# Patient Record
Sex: FEMALE | Race: WHITE
[De-identification: names, ages, dates, MRNs, and addresses within clinical notes are randomized per-mention and may not be internally consistent; named-entity substitution may affect disease eponyms.]

---

## 2020-01-29 ENCOUNTER — HOSPITAL ENCOUNTER (EMERGENCY)
Dept: HOSPITAL 7 - FB.ED | Age: 50
Discharge: HOME | End: 2020-01-29
Payer: MEDICAID

## 2020-01-29 DIAGNOSIS — F17.200: ICD-10-CM

## 2020-01-29 DIAGNOSIS — Z88.8: ICD-10-CM

## 2020-01-29 DIAGNOSIS — Z79.52: ICD-10-CM

## 2020-01-29 DIAGNOSIS — I10: ICD-10-CM

## 2020-01-29 DIAGNOSIS — Z88.6: ICD-10-CM

## 2020-01-29 DIAGNOSIS — Z88.2: ICD-10-CM

## 2020-01-29 DIAGNOSIS — Z88.5: ICD-10-CM

## 2020-01-29 DIAGNOSIS — J45.909: ICD-10-CM

## 2020-01-29 DIAGNOSIS — L50.0: Primary | ICD-10-CM

## 2020-01-29 DIAGNOSIS — R06.2: ICD-10-CM

## 2020-01-29 DIAGNOSIS — Z91.040: ICD-10-CM

## 2020-01-29 PROCEDURE — 96361 HYDRATE IV INFUSION ADD-ON: CPT

## 2020-01-29 PROCEDURE — 96375 TX/PRO/DX INJ NEW DRUG ADDON: CPT

## 2020-01-29 PROCEDURE — 94640 AIRWAY INHALATION TREATMENT: CPT

## 2020-01-29 PROCEDURE — 99284 EMERGENCY DEPT VISIT MOD MDM: CPT

## 2020-01-29 PROCEDURE — 96374 THER/PROPH/DIAG INJ IV PUSH: CPT

## 2020-01-29 NOTE — EDM.PDOC
ED HPI GENERAL MEDICAL PROBLEM





- General


Stated Complaint: SOB


Time Seen by Provider: 01/29/20 06:25


Source of Information: Reports: Patient


History Limitations: Reports: No Limitations





- History of Present Illness


INITIAL COMMENTS - FREE TEXT/NARRATIVE: 





49-year-old female who reports beginning on Sunday she noted that she needed to 

use her inhaler more frequently and she had a rash on her face and neck and 

upper chest that was quite itchy and red and raised. The rash has come and gone 

has been fairly persistently coming associated with itching for the past 3 days 

and today she has noticed some feelings of increased wheezing and trouble 

breathing. She thinks it might be a new laundry soap that she has changed to as 

there are really no other exposures she feels. She has no pain. She rates her 

pain as a 0/10. No nausea or vomiting. No sore throat. No nasal congestion. She 

has been eating and drinking normally. She is having no trouble swallowing. No 

weakness or dizziness. There are no other associated signs or symptoms. There 

are no other modifying factors. She does report that she was started on a new 

medication for blood pressure about 2 weeks ago. She has had no symptoms until 

Sunday of this week. There are no other associated signs or symptoms. There are 

no other modifying factors.


Onset: Other (3 days)


Duration: Getting Worse


Location: Reports: Generalized


Quality: Reports: Other (Itching)


Severity: Severe


Improves with: Reports: None


Worsens with: Reports: None


Context: Reports: Other (As above)


Associated Symptoms: Reports: Nausea/Vomiting (Mild nausea), Shortness of Breath


Treatments PTA: Reports: Other (see below) (Albuterol inhaler)





- Related Data


 Allergies











Allergy/AdvReac Type Severity Reaction Status Date / Time


 


aspirin Allergy  Other Verified 01/29/20 06:41


 


ibuprofen Allergy  Other Verified 01/29/20 06:41


 


latex Allergy  Other Verified 01/29/20 06:41


 


morphine Allergy  Other Verified 01/29/20 06:41


 


ranitidine [From Zantac] Allergy  Other Verified 01/29/20 06:41


 


Sulfa (Sulfonamide Allergy  Other Verified 01/29/20 06:41





Antibiotics)     











Home Meds: 


 Home Meds





diphenhydrAMINE [Benadryl] 50 mg PO QID #20 cap 01/29/20 [Rx]


predniSONE [Prednisone] 60 mg PO DAILY 4 Days #12 tablet 01/29/20 [Rx]











Past Medical History


Cardiovascular History: Reports: Hypertension


Respiratory History: Reports: Asthma





Social & Family History





- Tobacco Use


Smoking Status *Q: Current Every Day Smoker





- Alcohol Use


Alcohol Use History: No





- Living Situation & Occupation


Occupation: Employed





ED ROS ALLERGIC REACTION





- Review of Systems


Review Of Systems: See Below


Constitutional: Reports: No Symptoms


HEENT: Reports: Other (Physical position)


Respiratory: Reports: Shortness of Breath, Wheezing


Cardiovascular: Reports: No Symptoms


Endocrine: Reports: No Symptoms


GI/Abdominal: Reports: No Symptoms


: Reports: No Symptoms


Musculoskeletal: Reports: No Symptoms


Skin: Reports: Rash, Urticaria


Neurological: Reports: No Symptoms


Psychiatric: Reports: No Symptoms


Hematologic/Lymphatic: Reports: No Symptoms


Immunologic: Reports: No Symptoms





ED EXAM GENERAL NO PERIP PULSE





- Physical Exam


Exam: See Below


Exam Limited By: No Limitations


General Appearance: Alert, WD/WN


Eye Exam: Bilateral Eye: EOMI, Normal Inspection, PERRL


Ears: Normal External Exam, Hearing Grossly Normal


Nose: Normal Inspection, Normal Mucosa, No Blood


Throat/Mouth: Normal Inspection, Normal Lips, Normal Oropharynx, Normal Voice, 

No Airway Compromise


Head: Atraumatic, Normocephalic


Neck: Normal Inspection, Supple, Non-Tender, Full Range of Motion, Other (

Stridor)


Respiratory/Chest: No Respiratory Distress, No Accessory Muscle Use, Chest Non-

Tender, Wheezing, Other (Air movement somewhat decreased)


Cardiovascular: Normal Peripheral Pulses, Regular Rate, Rhythm, No JVD


GI/Abdominal: Normal Bowel Sounds, Soft, Non-Tender, No Organomegaly


Back Exam: Normal Inspection


Extremities: Normal Inspection, Normal Range of Motion, Non-Tender, No Pedal 

Edema, Normal Capillary Refill


Neurological: Alert, Oriented, CN II-XII Intact, Normal Cognition, No Motor/

Sensory Deficits


Psychiatric: Normal Affect


Skin Exam: Warm, Dry, Intact, Normal Color





Course





- Vital Signs


Last Recorded V/S: 


 Last Vital Signs











Temp  36.6 C   01/29/20 06:59


 


Pulse  98   01/29/20 06:59


 


Resp  14   01/29/20 06:59


 


BP  148/81 H  01/29/20 06:59


 


Pulse Ox  98   01/29/20 06:59














- Orders/Labs/Meds


Orders: 


 Active Orders 24 hr











 Category Date Time Status


 


 RT Aerosol Therapy [RC] ASDIRECTED Care  01/29/20 06:43 Active


 


 Sodium Chloride 0.9% [Normal Saline] 1,000 ml Med  01/29/20 06:45 Active





 IV ASDIRECTED   


 


 Sodium Chloride 0.9% [Saline Flush] Med  01/29/20 06:42 Active





 10 ml FLUSH ASDIRECTED PRN   


 


 Peripheral IV Insertion Adult [OM.PC] Routine Oth  01/29/20 06:42 Ordered








 Medication Orders





Sodium Chloride (Normal Saline)  1,000 mls @ 100 mls/hr IV ASDIRECTED ANGIE


   Last Admin: 01/29/20 06:57  Dose: 100 mls/hr


Sodium Chloride (Saline Flush)  10 ml FLUSH ASDIRECTED PRN


   PRN Reason: Keep Vein Open


   Last Admin: 01/29/20 07:02  Dose: 10 ml








Meds: 


Medications











Generic Name Dose Route Start Last Admin





  Trade Name Freq  PRN Reason Stop Dose Admin


 


Sodium Chloride  1,000 mls @ 100 mls/hr  01/29/20 06:45  01/29/20 06:57





  Normal Saline  IV   100 mls/hr





  ASDIRECTED ANGIE   Administration





     





     





     





     


 


Sodium Chloride  10 ml  01/29/20 06:42  01/29/20 07:02





  Saline Flush  FLUSH   10 ml





  ASDIRECTED PRN   Administration





  Keep Vein Open   





     





     





     














Discontinued Medications














Generic Name Dose Route Start Last Admin





  Trade Name Freq  PRN Reason Stop Dose Admin


 


Albuterol  2.5 mg  01/29/20 06:42  01/29/20 06:57





  Proventil Neb Soln  NEB  01/29/20 06:43  2.5 mg





  ONETIME ONE   Administration





     





     





     





     


 


Albuterol/Ipratropium  3 ml  01/29/20 06:42  01/29/20 06:57





  Duoneb 3.0-0.5 Mg/3 Ml  NEB  01/29/20 06:43  3 ml





  ONETIME ONE   Administration





     





     





     





     


 


Diphenhydramine HCl  50 mg  01/29/20 06:42  01/29/20 06:57





  Benadryl  IVPUSH  01/29/20 06:43  50 mg





  ONETIME ONE   Administration





     





     





     





     


 


Methylprednisolone Sodium Succinate  125 mg  01/29/20 06:42  01/29/20 06:57





  Solu-Medrol  IVPUSH  01/29/20 06:43  125 mg





  ONETIME ONE   Administration





     





     





     





     














- Re-Assessments/Exams


Free Text/Narrative Re-Assessment/Exam: 





01/29/20 08:25: Patient has received Solu-Medrol 125 mg IV, Benadryl 50 mg IV 

and DuoNeb/albuterol neb combination and has been observed. Her rash is pretty 

much gone. The wheezing is resolved. She feels much improved and is ready for 

discharge at this point.








Departure





- Departure


Time of Disposition: 08:45


Disposition: Home, Self-Care 01


Condition: Good (Improved)


Clinical Impression: 


 Urticaria, Wheezing





Allergic reaction


Qualifiers:


 Encounter type: initial encounter Qualified Code(s): T78.40XA - Allergy, 

unspecified, initial encounter








- Discharge Information


Prescriptions: 


diphenhydrAMINE [Benadryl] 50 mg PO QID #20 cap


predniSONE [Prednisone] 60 mg PO DAILY 4 Days #12 tablet


Instructions:  Hives, Easy-to-Read, Allergies, Adult


Referrals: 


PCP,Unknown [Primary Care Provider] - 


Additional Instructions: 


You are having an allergic reaction with hives and some wheezing. As you our 

thinking, this may be related to the new laundry soap that you started using. 

You should avoid using this in the future. Use your albuterol inhaler as 

needed. Medication as prescribed (Benadryl 50 mg, prednisone). Drink plenty of 

fluids. Rest. Back to the emergency department for worsening rash, worse 

breathing or any other concerning sign or symptom.





Sepsis Event Note





- Focused Exam


Vital Signs: 


 Vital Signs











  Temp Pulse Resp BP Pulse Ox


 


 01/29/20 06:59  36.6 C  98  14  148/81 H  98











Date Exam was Performed: 01/29/20


Time Exam was Performed: 08:50





- My Orders


Last 24 Hours: 


My Active Orders





01/29/20 06:42


Sodium Chloride 0.9% [Saline Flush]   10 ml FLUSH ASDIRECTED PRN 


Peripheral IV Insertion Adult [OM.PC] Routine 





01/29/20 06:43


RT Aerosol Therapy [RC] ASDIRECTED 





01/29/20 06:45


Sodium Chloride 0.9% [Normal Saline] 1,000 ml IV ASDIRECTED 














- Assessment/Plan


Last 24 Hours: 


My Active Orders





01/29/20 06:42


Sodium Chloride 0.9% [Saline Flush]   10 ml FLUSH ASDIRECTED PRN 


Peripheral IV Insertion Adult [OM.PC] Routine 





01/29/20 06:43


RT Aerosol Therapy [RC] ASDIRECTED 





01/29/20 06:45


Sodium Chloride 0.9% [Normal Saline] 1,000 ml IV ASDIRECTED

## 2020-10-12 NOTE — CR
CHEST TWO VIEWS



INDICATION:  Cough, shortness of breath, wheezing.



Two AP upright views of the chest were obtained 10/12/2020 and compared with 
06/08/2009.



Evidence of exogenous obesity is noted and is markedly increased compared with 
the previous study. 



The heart did not appear grossly enlarged but was prominent.  



Overlying EKG leads are noted.  



A definite active infiltrate or effusion was not identified.  



IMPRESSION:  No definite acute process.  



When clinically possible, full inspiration PA and lateral views of the chest may
be helpful.  

MTDD

## 2020-10-12 NOTE — EDM.PDOC
ED HPI GENERAL MEDICAL PROBLEM





- General


Chief Complaint: Asthma


Stated Complaint: LUNGS


Time Seen by Provider: 10/12/20 17:10


Source of Information: Reports: Patient


History Limitations: Reports: No Limitations





- History of Present Illness


INITIAL COMMENTS - FREE TEXT/NARRATIVE: 





50-year-old female who reports that for the last 2 weeks she has been having 

cough with wheezing. She reports that she also has been having intermittent 

pains in her upper chest that are sharp and seemed to worse when she breathes 

they seem to come and go lasting for minutes at a time and then there soreness 

after this. These symptoms have been increasingly worsening over the past 2 

weeks and becoming more frequent. She also notices that she has been using her 

albuterol nebulizer frequently and it helps for a little bit but then she has 

wheezing again. She feels that she is becoming more short of breath with time 

and that her wheezing is not going away with her medications. She was recently 

stung by a bee and had some allergic reaction to this and was on prednisone. She

is currently off prednisone for the past 2 days and she feels that her symptoms 

have worsened since then. He really has 0/10 pain now but the pain can be as 

high as a 10/10 and she was told to come to the emergency department by [facility] 

provider for further evaluation for this. She has had no nausea or vomiting. She

has been able to eat and drink normally. She has noted no fever. She has then 

able to swallow normally. There are no other associated signs or symptoms. There

are no other modifying factors.


Onset: Other (Ongoing for the past 2 weeks)


Duration: Getting Worse


Location: Reports: Chest


Quality: Reports: Sharp


Severity: Mild (To severe at times)


Improves with: Reports: Other (Seems to go away/improve on its own.)


Worsens with: Reports: Breathing, Other (Palpation)


Context: Reports: Other (As above.)


Associated Symptoms: Reports: No Other Symptoms (None except as above.)


Treatments PTA: Reports: Other (see below) (Nothing.)





- Related Data


                                    Allergies











Allergy/AdvReac Type Severity Reaction Status Date / Time


 


aspirin Allergy  Other Verified 10/12/20 16:09


 


ibuprofen Allergy  Other Verified 10/12/20 16:09


 


latex Allergy  Other Verified 10/12/20 16:09


 


morphine Allergy  Other Verified 10/12/20 16:09


 


ranitidine [From Zantac] Allergy  Other Verified 10/12/20 16:09


 


Sulfa (Sulfonamide Allergy  Other Verified 10/12/20 16:09





Antibiotics)     











Home Meds: 


                                    Home Meds





Albuterol Sulfate 1 applic IH QID PRN 20 [History]


Losartan/Hydrochlorothiazide [Losartan-HCTZ 50-12.5 MG] 1 each PO DAILY 20

[History]


Albuterol [Proventil Neb Soln] 2.5 mg NEB Q6HR #1 box 10/03/20 [Rx]


Azithromycin [Zithromax] 250 mg PO DAILY #4 tab 10/12/20 [Rx]


predniSONE [Prednisone] 1 dose PO QAM 12 Days #21 tablet 10/12/20 [Rx]


traMADol [Ultram] 50 mg PO Q6H PRN #12 tab 10/12/20 [Rx]











Past Medical History


Cardiovascular History: Reports: Hypertension


Respiratory History: Reports: Asthma


Musculoskeletal History: Reports: Other (See Below)


Other Musculoskeletal History: osteomylitis





- Infectious Disease History


Infectious Disease History: Reports: Chicken Pox





- Past Surgical History


HEENT Surgical History: Reports: Adenoidectomy, Myringotomy w Tube(s), 

Tonsillectomy


GI Surgical History: Reports: Appendectomy, Cholecystectomy


Female  Surgical History: Reports:  Section (History), Other (See 

Below) (Several laparoscopies for ovarian cystectomies.)





Social & Family History





- Family History


Family Medical History: Noncontributory





- Tobacco Use


Smoking Status *Q: Current Every Day Smoker


Years of Tobacco use: 35


Packs/Tins Daily: 1


Second Hand Smoke Exposure: Yes





- Caffeine Use


Caffeine Use: Reports: Coffee





- Recreational Drug Use


Recreational Drug Use: No





- Living Situation & Occupation


Occupation: Employed





ED ROS GENERAL





- Review of Systems


Review Of Systems: See Below


Constitutional: Reports: No Symptoms


HEENT: Reports: Other (Nasal congestion.)


Respiratory: Reports: Shortness of Breath, Wheezing, Pleuritic Chest Pain, Cough


Cardiovascular: Reports: Chest Pain


GI/Abdominal: Reports: No Symptoms


: Reports: No Symptoms


Musculoskeletal: Reports: No Symptoms


Skin: Reports: No Symptoms


Neurological: Reports: No Symptoms


Hematologic/Lymphatic: Reports: No Symptoms


Immunologic: Reports: No Symptoms





ED EXAM, GENERAL





- Physical Exam


Exam: See Below


Exam Limited By: No Limitations


General Appearance: Alert, Mild Distress, Obese


Eye Exam: Bilateral Eye: EOMI, Normal Inspection


Ears: Normal External Exam, Hearing Grossly Normal


Ear Exam: Bilateral Ear: Auricle Normal


Nose: No Blood, Nasal Drainage


Throat/Mouth: Normal Oropharynx, Normal Voice, No Airway Compromise


Head: Atraumatic, Normocephalic


Neck: Normal Inspection, Supple, Non-Tender, Full Range of Motion


Respiratory/Chest: No Accessory Muscle Use, Wheezing (Bilaterally), Prolonged 

Expiration.  No: Rales


Cardiovascular: Normal Peripheral Pulses, Regular Rate, Rhythm, No JVD


Peripheral Pulses: 2+: Radial (L), Radial (R), Dorsalis Pedis (L), Dorsalis 

Pedis (R)


GI/Abdominal: Normal Bowel Sounds, Soft, Non-Tender, No Mass


Back Exam: Normal Inspection


Extremities: Normal Inspection, Normal Range of Motion, Non-Tender, No Pedal 

Edema, Normal Capillary Refill


Neurological: Alert, Oriented, CN II-XII Intact, Normal Cognition, No 

Motor/Sensory Deficits


Psychiatric: Normal Affect


Skin Exam: Warm, Dry, Intact, Normal Color, No Rash





EKG INTERPRETATION


EKG Date: 10/12/20


Time: 16:31


Rhythm: NSR


Rate (Beats/Min): 83


Axis: Normal (83)


P-Wave: Present


QRS: Normal


ST-T: Other (Nonspecific T-wave abnormalities.)


QT: Normal


Comparison: NA - No Prior EKG (Essentially normal EKG.)





Course





- Vital Signs


Last Recorded V/S: 


                                Last Vital Signs











Temp  36.7 C   10/12/20 17:11


 


Pulse  88   10/12/20 17:11


 


Resp  18   10/12/20 17:11


 


BP  148/74 H  10/12/20 17:11


 


Pulse Ox  96   10/12/20 17:11














- Orders/Labs/Meds


Orders: 


                               Active Orders 24 hr











 Category Date Time Status


 


 Chest 1V Frontal [CR] Stat Exams  10/12/20 18:10 Taken


 


 Peripheral IV Insertion Adult [OM.PC] Routine Oth  10/12/20 17:22 Ordered


 


 EKG 12 Lead [EK] Routine Ther  10/12/20 17:22 Ordered











Labs: 


                                Laboratory Tests











  10/12/20 10/12/20 10/12/20 Range/Units





  17:40 17:40 17:40 


 


WBC  13.1 H    (4.5-12.0)  X10-3/uL


 


RBC  5.00    (3.23-5.20)  x10(6)uL


 


Hgb  15.0    (11.5-15.5)  g/dL


 


Hct  43.8    (30.0-51.3)  %


 


MCV  87.5    (80-96)  fL


 


MCH  29.9    (27.7-33.6)  pg


 


MCHC  34.2    (32.2-35.4)  g/dL


 


RDW  13.0    (11.5-15.5)  %


 


Plt Count  294    (125-369)  X10(3)uL


 


MPV  9.2    (7.4-10.4)  fL


 


Neut % (Auto)  64.1    (46-82)  %


 


Lymph % (Auto)  26.3    (13-37)  %


 


Mono % (Auto)  4.9    (4-12)  %


 


Eos % (Auto)  4    (1.0-5.0)  %


 


Baso % (Auto)  1    (0-2)  %


 


Neut # (Auto)  8.4 H    (1.6-8.3)  #


 


Lymph # (Auto)  3.5    (0.6-5.0)  #


 


Mono # (Auto)  0.6    (0.0-1.3)  #


 


Eos # (Auto)  0.5    (0.0-0.8)  #


 


Baso # (Auto)  0.1    (0.0-0.2)  #


 


D-Dimer, Quantitative     (0.0-0.59)  mg/LFEU


 


Sodium   138   (135-145)  mmol/L


 


Potassium   4.4   (3.5-5.3)  mmol/L


 


Chloride   102   (100-110)  mmol/L


 


Carbon Dioxide   25   (21-32)  mmol/L


 


BUN   15   (7-18)  mg/dL


 


Creatinine   0.8   (0.55-1.02)  mg/dL


 


Est Cr Clr Drug Dosing   TNP   


 


Estimated GFR (MDRD)   > 60   (>60)  


 


BUN/Creatinine Ratio   18.8   (9-20)  


 


Glucose   104   ()  mg/dL


 


Calcium   9.2   (8.6-10.2)  mg/dL


 


Magnesium   1.9   (1.8-2.5)  mg/dL


 


Total Bilirubin   0.5   (0.1-1.3)  mg/dL


 


AST   14   (5-25)  IU/L


 


ALT   38 H   (12-36)  U/L


 


Alkaline Phosphatase   85   ()  IU/L


 


Troponin I    8.9  (4.0-60.3)  pg/mL


 


NT-Pro-B Natriuret Pep     (<=125)  pg/mL


 


Total Protein   7.6   (6.0-8.0)  g/dL


 


Albumin   3.8   (3.5-5.2)  g/dL


 


Globulin   3.8   g/dL


 


Albumin/Globulin Ratio   1.0   














  10/12/20 10/12/20 Range/Units





  17:40 17:40 


 


WBC    (4.5-12.0)  X10-3/uL


 


RBC    (3.23-5.20)  x10(6)uL


 


Hgb    (11.5-15.5)  g/dL


 


Hct    (30.0-51.3)  %


 


MCV    (80-96)  fL


 


MCH    (27.7-33.6)  pg


 


MCHC    (32.2-35.4)  g/dL


 


RDW    (11.5-15.5)  %


 


Plt Count    (125-369)  X10(3)uL


 


MPV    (7.4-10.4)  fL


 


Neut % (Auto)    (46-82)  %


 


Lymph % (Auto)    (13-37)  %


 


Mono % (Auto)    (4-12)  %


 


Eos % (Auto)    (1.0-5.0)  %


 


Baso % (Auto)    (0-2)  %


 


Neut # (Auto)    (1.6-8.3)  #


 


Lymph # (Auto)    (0.6-5.0)  #


 


Mono # (Auto)    (0.0-1.3)  #


 


Eos # (Auto)    (0.0-0.8)  #


 


Baso # (Auto)    (0.0-0.2)  #


 


D-Dimer, Quantitative  0.47   (0.0-0.59)  mg/LFEU


 


Sodium    (135-145)  mmol/L


 


Potassium    (3.5-5.3)  mmol/L


 


Chloride    (100-110)  mmol/L


 


Carbon Dioxide    (21-32)  mmol/L


 


BUN    (7-18)  mg/dL


 


Creatinine    (0.55-1.02)  mg/dL


 


Est Cr Clr Drug Dosing    


 


Estimated GFR (MDRD)    (>60)  


 


BUN/Creatinine Ratio    (9-20)  


 


Glucose    ()  mg/dL


 


Calcium    (8.6-10.2)  mg/dL


 


Magnesium    (1.8-2.5)  mg/dL


 


Total Bilirubin    (0.1-1.3)  mg/dL


 


AST    (5-25)  IU/L


 


ALT    (12-36)  U/L


 


Alkaline Phosphatase    ()  IU/L


 


Troponin I    (4.0-60.3)  pg/mL


 


NT-Pro-B Natriuret Pep   131 H  (<=125)  pg/mL


 


Total Protein    (6.0-8.0)  g/dL


 


Albumin    (3.5-5.2)  g/dL


 


Globulin    g/dL


 


Albumin/Globulin Ratio    











Meds: 


Medications














Discontinued Medications














Generic Name Dose Route Start Last Admin





  Trade Name Freq  PRN Reason Stop Dose Admin


 


Albuterol  5 mg  10/12/20 17:24  10/12/20 17:49





  Proventil Neb Soln  NEB  10/12/20 17:25  5 mg





  ONETIME ONE   Administration


 


Albuterol/Ipratropium  3 ml  10/12/20 17:24  10/12/20 17:49





  Duoneb 3.0-0.5 Mg/3 Ml  NEB  10/12/20 17:25  3 ml





  ONETIME ONE   Administration


 


Azithromycin  500 mg  10/12/20 18:51  10/12/20 18:56





  Zithromax  PO  10/12/20 18:52  500 mg





  ONETIME ONE   Administration


 


Methylprednisolone Sodium Succinate  125 mg  10/12/20 17:25  10/12/20 17:49





  Solu-Medrol  IVPUSH  10/12/20 17:26  125 mg





  ONETIME ONE   Administration


 


Sodium Chloride  10 ml  10/12/20 17:22 





  Saline Flush  FLUSH  





  ASDIRECTED PRN  





  Keep Vein Open  














- Radiology Interpretation


Free Text/Narrative:: 





One view chest x-ray shows no definite infiltrate.





- Re-Assessments/Exams


Free Text/Narrative Re-Assessment/Exam: 





10/12/20 18:45: Patient reports that she feels much improved. Her breathing is 

much better. She still has some soreness in her chest and it is reproducible 

with palpation and with deep breath at this point. Her d-dimer and troponin was 

normal. With the amount time she has been having the chest pain, I feel that 

this rules out MI as her EKG is reassuring as well. The pain in her chest 

appears to be musculoskeletal. Her exam shows much improved air movement with 

almost no wheezing now. I will plan on treating the patient with Zithromax for 5

 day course. I will also place her on prednisone over a longer taper again (12 

days) and I will give her prescription for tramadol for pain. She was given a 

take-home pack of the tramadol and a prescription to take home as well. A 

prescription for the prednisone was sent to [facility]. She is 

to follow-up with her primary provider as needed. Percussions and reasons for 

return to the emergency department discussed with the patient while she was in 

the emergency department ever detailed in the patient's discharge instructions.








Departure





- Departure


Time of Disposition: 19:00


Disposition: Home, Self-Care 01


Condition: Good (Improved)


Clinical Impression: 


 Acute exacerbation of extrinsic asthma, Musculoskeletal chest pain





Asthmatic bronchitis


Qualifiers:


 Asthma severity: moderate Asthma persistence: persistent Asthma complication 

type: with acute exacerbation Qualified Code(s): J45.41 - Moderate persistent 

asthma with (acute) exacerbation








- Discharge Information


Prescriptions: 


predniSONE [Prednisone] 1 dose PO QAM 12 Days #21 tablet


traMADol [Ultram] 50 mg PO Q6H PRN #12 tab


 PRN Reason: Moderate to severe pain


Azithromycin [Zithromax] 250 mg PO DAILY #4 tab


Instructions:  How to Use a Nebulizer, Adult, Chest Wall Pain, Easy-to-Read, 

Nonspecific Chest Pain, Adult, Easy-to-Read, Asthma, Adult, Easy-to-Read


Referrals: 


PCP,None [Primary Care Provider] - 


Forms:  ED Department Discharge, ED Return to Work/School Form


Additional Instructions: 


Your blood tests were all reassuringly normal. Your EKG was reassuring and 

showed no evidence of a heart attack. Your heart enzyme test was normal and this

rules out a heart attack. You do not appear to be having any problem with your 

heart at this time. Also a screening blood tests for blood clots was negative 

and this rules out blood clots as well. This chest pain appears to be from the 

muscles in your chest wall. Continue to do the albuterol nebulizer treatments 

every 4 hours as needed at home. Increase your fluid intake. You can take 

Tylenol 1000 mg by mouth every 6 hours as needed for pain. Medication as 

prescribed for more severe pain (tramadol 50 mg). Other medication as prescribed

(Zithromax, prednisone). Follow-up with your primary doctor as needed. Back to 

the emergency department for worse breathing, unrelenting vomiting, worsening 

chest pain or any other concerning sign or symptom.





Sepsis Event Note (ED)





- Focused Exam


Vital Signs: 


                                   Vital Signs











  Temp Pulse Resp BP Pulse Ox


 


 10/12/20 17:11  36.7 C  88  18  148/74 H  96














- My Orders


Last 24 Hours: 


My Active Orders





10/12/20 17:22


Peripheral IV Insertion Adult [OM.PC] Routine 


EKG 12 Lead [EK] Routine 





10/12/20 18:10


Chest 1V Frontal [CR] Stat 














- Assessment/Plan


Last 24 Hours: 


My Active Orders





10/12/20 17:22


Peripheral IV Insertion Adult [OM.PC] Routine 


EKG 12 Lead [EK] Routine 





10/12/20 18:10


Chest 1V Frontal [CR] Stat

## 2023-08-19 NOTE — EDM.PDOC
ED HPI GENERAL MEDICAL PROBLEM





- General


Chief Complaint: Allergic Reaction


Stated Complaint: BEE STING


Time Seen by Provider: 10/03/20 16:35


Source of Information: Reports: Patient


History Limitations: Reports: No Limitations





- History of Present Illness


INITIAL COMMENTS - FREE TEXT/NARRATIVE: 





states she was just stung by a bee : not 30mins before getting to ER


has had bee sting before and she is allergic to bees 


had stung her on the left 2nd toe 


Also developed wheezing immediately , 


did take benadryl before coming to the ER


Onset: Today


Onset Date: 10/03/20


Onset Time: 16:00


Duration: Getting Worse


Location: Reports: Face, Neck, Chest, Lower Extremity, Left


Quality: Reports: Burning


Severity: Moderate


Improves with: Reports: Cold Therapy


Context: Reports: Other (stung by bee on her left otoe)


Associated Symptoms: Reports: Cough, Rash, Weakness


Treatments PTA: Reports: Other (see below) (benadryl)


  ** 2nd toe at the left foot


Pain Score (Numeric/FACES): 9





- Related Data


                                    Allergies











Allergy/AdvReac Type Severity Reaction Status Date / Time


 


aspirin Allergy  Other Verified 10/03/20 18:23


 


ibuprofen Allergy  Other Verified 10/03/20 18:23


 


latex Allergy  Other Verified 10/03/20 18:23


 


morphine Allergy  Other Verified 10/03/20 18:23


 


ranitidine [From Zantac] Allergy  Other Verified 10/03/20 18:23


 


Sulfa (Sulfonamide Allergy  Other Verified 10/03/20 18:23





Antibiotics)     











Home Meds: 


                                    Home Meds





Albuterol Sulfate 1 applic IH QID PRN 01/29/20 [History]


Budesonide/Formoterol [Symbicort 80-4.5 MCG] 1 puff INH BID 01/29/20 [History]


Losartan/Hydrochlorothiazide [Losartan-HCTZ 50-12.5 MG] 1 each PO DAILY 01/29/20

[History]


diphenhydrAMINE [Benadryl] 50 mg PO QID #20 cap 01/29/20 [Rx]


predniSONE [Prednisone] 60 mg PO DAILY 4 Days #12 tablet 01/29/20 [Rx]


Albuterol [Proventil Neb Soln] 2.5 mg NEB Q6HR #1 box 10/03/20 [Rx]


Triamcinolone Acetonide [Triamcinolone Acetonide 0.5% Oint] 15 gm TOP BID #2 

tube 10/03/20 [Rx]


predniSONE [Prednisone] 50 mg PO DAILY #5 tablet 10/03/20 [Rx]











Past Medical History


Cardiovascular History: Reports: Hypertension


Respiratory History: Reports: Asthma


Musculoskeletal History: Reports: Other (See Below)


Other Musculoskeletal History: osteomylitis





- Past Surgical History


HEENT Surgical History: Reports: Adenoidectomy, Myringotomy w Tube(s), 

Tonsillectomy


GI Surgical History: Reports: Appendectomy, Cholecystectomy





Social & Family History





- Living Situation & Occupation


Occupation: Employed





ED ROS ALLERGIC REACTION





- Review of Systems


Review Of Systems: See Below


Constitutional: Reports: No Symptoms


HEENT: Reports: No Symptoms, Rhinitis.  Denies: Throat Pain, Throat Swelling


Respiratory: Reports: Shortness of Breath, Wheezing, Cough, Sputum.  Denies: 

Pleuritic Chest Pain


Cardiovascular: Reports: No Symptoms


Endocrine: Reports: No Symptoms


GI/Abdominal: Reports: No Symptoms


: Reports: No Symptoms


Musculoskeletal: Reports: No Symptoms


Skin: Reports: Rash


Neurological: Reports: No Symptoms





ED EXAM GENERAL NO PERIP PULSE





- Physical Exam


Exam: See Below


Exam Limited By: No Limitations


General Appearance: Alert, WD/WN, No Apparent Distress, Obese


Eye Exam: Bilateral Eye: EOMI


Ears: Normal External Exam


Nose: Normal Inspection


Throat/Mouth: Normal Inspection


Head: Normocephalic


Neck: Supple, Non-Tender


Respiratory/Chest: Decreased Breath Sounds, Rhonchi, Wheezing


Cardiovascular: Regular Rate, Rhythm


GI/Abdominal: Soft, Non-Tender


Extremities: Increased Warmth, Redness (on the tip of the left second toe)


Neurological: Alert, Oriented, CN II-XII Intact


Psychiatric: Normal Affect





Course





- Vital Signs


Last Recorded V/S: 


                                Last Vital Signs











Temp  36.7 C   10/03/20 17:40


 


Pulse  91   10/03/20 17:40


 


Resp  20   10/03/20 17:40


 


BP  152/74 H  10/03/20 17:40


 


Pulse Ox  97   10/03/20 17:40














- Orders/Labs/Meds


Meds: 


Medications














Discontinued Medications














Generic Name Dose Route Start Last Admin





  Trade Name Freq  PRN Reason Stop Dose Admin


 


Albuterol/Ipratropium  3 ml  10/03/20 16:34  10/03/20 16:44





  Duoneb 3.0-0.5 Mg/3 Ml  NEB  10/03/20 16:35  3 ml





  ONETIME ONE   Administration


 


Albuterol/Ipratropium  3 ml  10/03/20 17:05  10/03/20 17:11





  Duoneb 3.0-0.5 Mg/3 Ml  NEB  10/03/20 17:06  3 ml





  ONETIME ONE   Administration


 


Budesonide  0.5 mg  10/03/20 16:34  10/03/20 16:43





  Pulmicort  NEB  10/03/20 16:35  0.5 mg





  ONETIME ONE   Administration


 


Hydrocortisone  1 gm  10/03/20 17:12  10/03/20 17:31





  Hydrocortisone 1% Oint  TOP  10/03/20 17:13  1 applic





  NOW STA   Administration


 


Methylprednisolone Sodium Succinate  125 mg  10/03/20 16:34  10/03/20 16:46





  Solu-Medrol  IM  10/03/20 16:35  Not Given





  ONETIME ONE  


 


Methylprednisolone Sodium Succinate  Confirm  10/03/20 16:34  10/03/20 16:41





  Solu-Medrol  Administered  10/03/20 16:35  Not Given





  Dose  





  125 mg  





  .ROUTE  





  .STK-MED ONE  


 


Methylprednisolone Sodium Succinate  125 mg  10/03/20 16:46  10/03/20 16:49





  Solu-Medrol  IVPUSH  10/03/20 16:47  125 mg





  ONETIME ONE   Administration














- Re-Assessments/Exams


Free Text/Narrative Re-Assessment/Exam: 





10/03/20 17:38


pt did feel better after Neb x2 , solumedrol , cold compress to toe, Wheezing 

did stop


10/03/20 17:39








Departure





- Departure


Time of Disposition: 17:50


Disposition: Home, Self-Care 01


Condition: Fair


Clinical Impression: 


 Allergic reaction to bee sting, Wheezing





Allergic reaction


Qualifiers:


 Encounter type: initial encounter Qualified Code(s): T78.40XA - Allergy, 

unspecified, initial encounter








- Discharge Information


*PRESCRIPTION DRUG MONITORING PROGRAM REVIEWED*: Not Applicable


*COPY OF PRESCRIPTION DRUG MONITORING REPORT IN PATIENT LISA: Not Applicable


Prescriptions: 


predniSONE [Prednisone] 50 mg PO DAILY #5 tablet


Albuterol [Proventil Neb Soln] 2.5 mg NEB Q6HR #1 box


Triamcinolone Acetonide [Triamcinolone Acetonide 0.5% Oint] 15 gm TOP BID #2 

tube


Instructions:  Allergies, Adult, Easy-to-Read, Bee, Wasp, or Hornet Sting, Adult


Referrals: 


PCP,None [Ordering Only Provider] - 


Forms:  ED Department Discharge


Additional Instructions: 


1) Elevate foot and apply cold compress to the affected area of the toe


2) Continue with Neb every 4 hrs for at least 24 hrs 


3) Start prednisone tomorrow





Sepsis Event Note (ED)





- Focused Exam


Vital Signs: 


                                   Vital Signs











  Temp Pulse Resp BP Pulse Ox


 


 10/03/20 17:40  36.7 C  91  20  152/74 H  97


 


 10/03/20 16:25  36.7 C  96  17  149/70 H  99 Reason for Disposition   Sounds like a life-threatening emergency to the triager    Additional Information   Negative: CARDIAC ARREST suspected   Negative: Breathing stopped   Negative: Anaphylactic reaction (life-threatening allergic reaction)   Negative: Asthma attack, severe (e.g., struggling for each breath, unable to speak)   Negative: Bleeding (severe) from skin AND can't be stopped   Negative: Bleeding (severe) from nose, mouth, vomiting, anus, vagina AND can't be stopped   Negative: Breathing difficulty, severe (e.g., struggling for each breath, unable to speak)   Negative: Burn, severe (e.g., burn area larger than 20 palms of hand [>10% BSA] with blisters)   Negative: Choking, severe (e.g., unable to breathe, talk)   Negative: Coma (e.g., unconscious, not responding to verbal or painful stimulus)   Negative: Cyanosis (bluish or gray lips or face)   Negative: Dehydration, severe (e.g., cold/pale/clammy skin, too weak to stand)   Negative: Drowning, near   Negative: Electrical shock, severe   Negative: Heart attack suspected   Negative: Homicidal threat   Negative: Hyperthermia (from heat exposure) > 105 F (40.5 C) rectally or > 104 F (40.0 C) orally   Negative: Hypothermia (from cold exposure) < 95 F (35 C) rectally or < 94 F (34.4 C) orally   Negative: Lightning strike injury   Negative: Meningococcal sepsis suspected (purple spots/dots with fever)   Negative: Mental status altered (confusion) now   Negative: Neck trauma, major (e.g., MVA, diving, trampoline, contact sports, fall > 10 feet or 3 meters, hanging)   Negative: Penetrating wound of chest or abdomen   Negative: Purpura/petechiae with fever (purple spots/dots with fever)   Negative: Respiratory distress, severe (e.g., struggling for each breath, unable to speak)   Negative: Seizure lasts > 5 minutes or first seizure ever   Negative: Seizure caused by head injury   Negative: Shock suspected (e.g., cold/pale/clammy skin,  "too weak to stand, low BP, rapid pulse)   Negative: Stroke suspected (e.g., sudden onset of weakness of the face, arm or leg on one side of the body)   Negative: Suicide attempt   Negative: Trauma, severe   Negative: Weakness, severe (i.e., unable to walk or barely able to walk, requires support) AND new-onset or worsening    Answer Assessment - Initial Assessment Questions  1. SYMPTOMS: \"What is the most serious symptom?\"      Pouring sweat, can't walk across floor  2. AIRWAY: \"Are they breathing?\" (e.g., Yes, No)  (R/O: airway blockage, respiratory arrest)       yes  3. BREATHING: \"Is there difficulty breathing?\" (e.g., Yes, No, Unknown; severity)  (R/O: respiratory distress)      yes  4. CIRCULATION: \"Are you feeling weak?\"  (e.g., Yes, No, Unknown; severity)  (R/O: shock)      no  5. BLEEDING: \"Is there any bleeding?\" (e.g., Yes, No) If Yes, ask: \"How bad is the bleeding?\" (e.g., actively dripping or spurting)  (R/O: hemorrhage)      No   6. CONSCIOUS: \"Are they awake and alert?\" (e.g., alert-oriented, confused, lethargic, stuporous, comatose) (R/O: altered mental status, coma)      awake    Protocols used: 911 Symptoms-ADULT-AH    "